# Patient Record
Sex: FEMALE | Race: WHITE
[De-identification: names, ages, dates, MRNs, and addresses within clinical notes are randomized per-mention and may not be internally consistent; named-entity substitution may affect disease eponyms.]

---

## 2017-03-21 ENCOUNTER — HOSPITAL ENCOUNTER (EMERGENCY)
Dept: HOSPITAL 63 - ER | Age: 67
Discharge: HOME | End: 2017-03-21
Payer: MEDICARE

## 2017-03-21 VITALS — BODY MASS INDEX: 22.15 KG/M2 | WEIGHT: 125 LBS | HEIGHT: 63 IN

## 2017-03-21 VITALS — DIASTOLIC BLOOD PRESSURE: 76 MMHG | SYSTOLIC BLOOD PRESSURE: 135 MMHG

## 2017-03-21 DIAGNOSIS — E78.00: ICD-10-CM

## 2017-03-21 DIAGNOSIS — R07.9: Primary | ICD-10-CM

## 2017-03-21 DIAGNOSIS — F41.9: ICD-10-CM

## 2017-03-21 DIAGNOSIS — Z86.73: ICD-10-CM

## 2017-03-21 DIAGNOSIS — I10: ICD-10-CM

## 2017-03-21 LAB
ALBUMIN SERPL-MCNC: 3.7 G/DL (ref 3.4–5)
ALBUMIN/GLOB SERPL: 1.2 {RATIO} (ref 1–1.7)
ALP SERPL-CCNC: 92 U/L (ref 46–116)
ALT SERPL-CCNC: 29 U/L (ref 14–59)
ANION GAP SERPL CALC-SCNC: 9 MMOL/L (ref 6–14)
AST SERPL-CCNC: 24 U/L (ref 15–37)
BASOPHILS # BLD AUTO: 0 X10^3/UL (ref 0–0.2)
BASOPHILS NFR BLD: 0 % (ref 0–3)
BILIRUB SERPL-MCNC: 0.9 MG/DL (ref 0.2–1)
BUN ISTAT: 15 MG/DL (ref 8–26)
BUN/CREAT SERPL: 15 (ref 6–20)
CA-I SERPL ISE-MCNC: 16 MG/DL (ref 7–20)
CALCIUM SERPL-MCNC: 8.9 MG/DL (ref 8.5–10.1)
CHLORIDE SERPL-SCNC: 107 MMOL/L (ref 98–107)
CO2 SERPL-SCNC: 28 MMOL/L (ref 21–32)
CREAT SERPL-MCNC: 1.1 MG/DL (ref 0.6–1)
EOSINOPHIL NFR BLD: 0.2 X10^3/UL (ref 0–0.7)
EOSINOPHIL NFR BLD: 3 % (ref 0–3)
ERYTHROCYTE [DISTWIDTH] IN BLOOD BY AUTOMATED COUNT: 12.8 % (ref 11.5–14.5)
GFR SERPLBLD BASED ON 1.73 SQ M-ARVRAT: 49.4 ML/MIN
GLOBULIN SER-MCNC: 3.1 G/DL (ref 2.2–3.8)
GLUCOSE BLD-MCNC: 134 MG/DL (ref 60–99)
GLUCOSE SERPL-MCNC: 138 MG/DL (ref 70–99)
HCT VFR BLD AUTO: 37 %
HCT VFR BLD CALC: 38.5 % (ref 36–47)
HEMOGLOBIN ISTAT: 12.6 GM/DL
HGB BLD-MCNC: 13 G/DL (ref 12–15.5)
LYMPHOCYTES # BLD: 1.8 X10^3/UL (ref 1–4.8)
LYMPHOCYTES NFR BLD AUTO: 28 % (ref 24–48)
MCH RBC QN AUTO: 32 PG (ref 25–35)
MCHC RBC AUTO-ENTMCNC: 34 G/DL (ref 31–37)
MCV RBC AUTO: 94 FL (ref 79–100)
MONO #: 0.4 X10^3/UL (ref 0–1.1)
MONOCYTES NFR BLD: 6 % (ref 0–9)
NEUT #: 4.1 X10^3UL (ref 1.8–7.7)
NEUTROPHILS NFR BLD AUTO: 63 % (ref 31–73)
PLATELET # BLD AUTO: 188 X10^3/UL (ref 140–400)
POTASSIUM BLD-SCNC: 3.5 MMOL/L (ref 3.5–5)
POTASSIUM SERPL-SCNC: 3.8 MMOL/L (ref 3.5–5.1)
PROT SERPL-MCNC: 6.8 G/DL (ref 6.4–8.2)
RBC # BLD AUTO: 4.1 X10^6/UL (ref 3.5–5.4)
SODIUM SERPL-SCNC: 141 MMOL/L (ref 135–145)
SODIUM SERPL-SCNC: 144 MMOL/L (ref 136–145)
WBC # BLD AUTO: 6.4 X10^3/UL (ref 4–11)

## 2017-03-21 PROCEDURE — 80047 BASIC METABLC PNL IONIZED CA: CPT

## 2017-03-21 PROCEDURE — 84484 ASSAY OF TROPONIN QUANT: CPT

## 2017-03-21 PROCEDURE — 80053 COMPREHEN METABOLIC PANEL: CPT

## 2017-03-21 PROCEDURE — 93005 ELECTROCARDIOGRAM TRACING: CPT

## 2017-03-21 PROCEDURE — 71020: CPT

## 2017-03-21 PROCEDURE — 36415 COLL VENOUS BLD VENIPUNCTURE: CPT

## 2017-03-21 PROCEDURE — 85027 COMPLETE CBC AUTOMATED: CPT

## 2017-03-21 NOTE — EKG
[facility] [address], [place] [address]

Test Date:    2017               Test Time:    17:50:28

Pat Name:     LANA KATHLEEN           Department:   

Patient ID:   [id number]           Room:          

Gender:       F                        Technician:   RODRIGO

:          1950               Requested By: MATI OSMAN

Order Number: [id number]            Reading MD:     

                                 Measurements

Intervals                              Axis          

Rate:         72                       P:            40

SD:           146                      QRS:          -39

QRSD:         84                       T:            44

QT:           404                                    

QTc:          444                                    

                           Interpretive Statements

SINUS RHYTHM

ABNORMAL LEFT AXIS DEVIATION

R-S TRANSITION ZONE IN V LEADS DISPLACED TO THE LEFT

LEFT ANTERIOR FASCICULAR BLOCK

ABNORMAL ECG

RI6.01          Unconfirmed report

No previous ECG available for comparison

## 2017-03-21 NOTE — ED.ADGEN
Past History


Past Medical History:  Anxiety, High Cholesterol, Hypertension, TIA


Past Surgical History:  Appendectomy, Hysterectomy


Alcohol Use:  None


Drug Use:  None





Adult General


HPI


HPI





Patient is a 66-year-old female presents complaining of 4-5 day history of 

intermittent palpitations. She denies any associated diaphoresis, dyspnea, 

nausea, vomiting. She states that it is most pronounced at night which she 

relates out of bed and begins to think about all the increased stress and 

events in her life. She does have a history of anxiety. She was seen by her 

counselor yesterday. She did take an Ativan shortly prior to arrival today 

reports that is made an improvement in her symptoms. She denies any trisha pain 

but reports a "fluttering" on both sides of her chest.





Review of Systems


Review of Systems





Constitutional: Denies fever or chills []


Eyes: Denies change in visual acuity, redness, or eye pain []


HENT: Denies nasal congestion or sore throat []


Respiratory: Denies cough or shortness of breath []


Cardiovascular: No additional information not addressed in HPI []


GI: Denies abdominal pain, nausea, vomiting, bloody stools or diarrhea []


: Denies dysuria or hematuria []


Musculoskeletal: Denies back pain or joint pain []


Integument: Denies rash or skin lesions []


Neurologic: Denies headache, focal weakness or sensory changes []


Endocrine: Denies polyuria or polydipsia []





Allergies


Allergies





 Allergies








Coded Allergies Type Severity Reaction Last Updated Verified


 


  No Known Drug Allergies    3/21/17 No











Physical Exam


Physical Exam





Constitutional: Well developed, well nourished, no acute distress, non-toxic 

appearance. []


HENT: Normocephalic, atraumatic, bilateral external ears normal, oropharynx 

moist, no oral exudates, nose normal. []


Eyes: PERRLA, EOMI, conjunctiva normal, no discharge. [] 


Neck: Normal range of motion, no tenderness, supple, no stridor. [] 


Cardiovascular:Heart rate regular rhythm, no murmur []


Lungs & Thorax:  Bilateral breath sounds clear to auscultation []


Abdomen: Bowel sounds normal, soft, no tenderness, no masses, no pulsatile 

masses. [] 


Skin: Warm, dry, no erythema, no rash. [] 


Back: No tenderness, no CVA tenderness. [] 


Extremities: No tenderness, no cyanosis, no clubbing, ROM intact, no edema. [] 


Neurologic: Alert and oriented X 3, normal motor function, normal sensory 

function, no focal deficits noted. []


Psychologic: Affect normal, judgement normal, mood normal. []





Current Patient Data


Vital Signs





 Vital Signs








  Date Time  Temp Pulse Resp B/P Pulse Ox O2 Delivery O2 Flow Rate FiO2


 


3/21/17 17:25 98.2 78 18  98 Room Air  








Lab Results





 Laboratory Tests








Test


  3/21/17


18:10 3/21/17


18:14 3/21/17


18:30


 


White Blood Count


  6.4x10^3/uL


(4.0-11.0) 


  


 


 


Red Blood Count


  4.10x10^6/uL


(3.50-5.40) 


  


 


 


Hemoglobin


  13.0g/dL


(12.0-15.5) 


  


 


 


Hematocrit


  38.5%


(36.0-47.0) 


  


 


 


Mean Corpuscular Volume 94fL ()    


 


Mean Corpuscular Hemoglobin 32pg (25-35)    


 


Mean Corpuscular Hemoglobin


Concent 34g/dL (31-37)


  


  


 


 


Red Cell Distribution Width


  12.8%


(11.5-14.5) 


  


 


 


Platelet Count


  188x10^3/uL


(140-400) 


  


 


 


Neutrophils (%) (Auto) 63% (31-73)    


 


Lymphocytes (%) (Auto) 28% (24-48)    


 


Monocytes (%) (Auto) 6% (0-9)    


 


Eosinophils (%) (Auto) 3% (0-3)    


 


Basophils (%) (Auto) 0% (0-3)    


 


Neutrophils # (Auto)


  4.1x10^3uL


(1.8-7.7) 


  


 


 


Lymphocytes # (Auto)


  1.8x10^3/uL


(1.0-4.8) 


  


 


 


Monocytes # (Auto)


  0.4x10^3/uL


(0.0-1.1) 


  


 


 


Eosinophils # (Auto)


  0.2x10^3/uL


(0.0-0.7) 


  


 


 


Basophils # (Auto)


  0.0x10^3/uL


(0.0-0.2) 


  


 


 


Sodium Level


  144mmol/L


(136-145) 


  


 


 


Potassium Level


  3.8mmol/L


(3.5-5.1) 


  


 


 


Chloride Level


  107mmol/L


() 


  


 


 


Carbon Dioxide Level


  28mmol/L


(21-32) 


  


 


 


Anion Gap


  9 (6-14)  


  


  18mmol/L


(6-14)  H


 


Blood Urea Nitrogen


  16mg/dL (7-20)


  


  


 


 


Creatinine


  1.1mg/dL


(0.6-1.0)  H 


  


 


 


Estimated GFR


(Cockcroft-Gault) 49.4  


  


  


 


 


BUN/Creatinine Ratio 15 (6-20)    


 


Glucose Level


  138mg/dL


(70-99)  H 


  134mg/dL


(60-99)  H


 


Calcium Level


  8.9mg/dL


(8.5-10.1) 


  


 


 


Total Bilirubin


  0.9mg/dL


(0.2-1.0) 


  


 


 


Aspartate Amino Transferase


(AST) 24U/L (15-37)  


  


  


 


 


Alanine Aminotransferase (ALT) 29U/L (14-59)    


 


Alkaline Phosphatase


  92U/L ()


  


  


 


 


Troponin I Quantitative


  < 0.017ng/mL


(0-0.055) 


  


 


 


Total Protein


  6.8g/dL


(6.4-8.2) 


  


 


 


Albumin


  3.7g/dL


(3.4-5.0) 


  


 


 


Albumin/Globulin Ratio 1.2 (1.0-1.7)    


 


POC Troponin I


  


  0.00ng/ml


(<0.08) 


 


 


POC Hemoglobin   12.6gm/dL  


 


POC Hematocrit   37%  


 


POC Sodium


  


  


  141mmol/L


(135-145)


 


POC Potassium


  


  


  3.5mmol/L


(3.5-5.0)


 


POC Chloride


  


  


  103mmol/L


()


 


POC Total CO2


  


  


  25mmol/L


(23-32)


 


POC Blood Urea Nitrogen


  


  


  15mg/dL (8-26)


 


 


POC Creatinine


  


  


  0.9mg/dL


(0.5-1.4)


 


POC Ionized Calcium (Juliet)


  


  


  1.13mmol/L


(1.13-1.32)











EKG


EKG


EKG interpreted by me, normal sinus rhythm, 70 bpm, no ST segment elevation, 

left axis deviation. []





Radiology/Procedures


Radiology/Procedures


Chest x-ray interpreted by me, no acute cardiopulmonary process. []





Course & Med Decision Making


Course & Med Decision Making


Pertinent Labs and Imaging studies reviewed. (See chart for details)


Given the patient's reassuring constellation of symptoms and her history of 

anxiety combined with her reassuring workup today do believe the patient is 

appropriate for discharge. She will follow with her primary care physician in 

the next 2-3 days and return emergency department sooner she develops any new 

or worsening symptoms.


[]





Final Impression


Final Impression


Chest pain 


Anxiety[]


Problems:  





Dragon Disclaimer


Dragon Disclaimer


This electronic medical record was generated, in whole or in part, using a 

voice recognition dictation system.








MATI OSMAN MD Mar 21, 2017 18:29

## 2017-03-22 NOTE — RAD
Chest, 2 views, 3/21/2017:



History: Chest pain with palpitations



The heart size and pulmonary vascularity are normal. No pulmonary infiltrates

are seen. There is no evidence of pleural fluid.



IMPRESSION: No acute cardiopulmonary abnormality is detected.

## 2022-05-09 ENCOUNTER — HOSPITAL ENCOUNTER (EMERGENCY)
Dept: HOSPITAL 63 - ER | Age: 72
Discharge: HOME | End: 2022-05-09
Payer: MEDICARE

## 2022-05-09 VITALS — BODY MASS INDEX: 25.94 KG/M2 | HEIGHT: 63 IN | WEIGHT: 146.39 LBS

## 2022-05-09 VITALS — SYSTOLIC BLOOD PRESSURE: 156 MMHG | DIASTOLIC BLOOD PRESSURE: 81 MMHG

## 2022-05-09 DIAGNOSIS — I10: ICD-10-CM

## 2022-05-09 DIAGNOSIS — Z13.89: Primary | ICD-10-CM

## 2022-05-09 DIAGNOSIS — E78.00: ICD-10-CM

## 2022-05-09 DIAGNOSIS — F41.9: ICD-10-CM

## 2022-05-09 DIAGNOSIS — F03.90: ICD-10-CM

## 2022-05-09 DIAGNOSIS — Z20.822: ICD-10-CM

## 2022-05-09 DIAGNOSIS — Z86.73: ICD-10-CM

## 2022-05-09 LAB
ACETAMIN: < 2 MCG/ML (ref 10–30)
ALBUMIN SERPL-MCNC: 3.7 G/DL (ref 3.4–5)
ALBUMIN/GLOB SERPL: 1 {RATIO} (ref 1–1.7)
ALP SERPL-CCNC: 101 U/L (ref 46–116)
ALT SERPL-CCNC: 22 U/L (ref 14–59)
ANION GAP SERPL CALC-SCNC: 12 MMOL/L (ref 6–14)
AST SERPL-CCNC: 17 U/L (ref 15–37)
BASOPHILS # BLD AUTO: 0.1 X10^3/UL (ref 0–0.2)
BASOPHILS NFR BLD: 1 % (ref 0–3)
BILIRUB SERPL-MCNC: 0.6 MG/DL (ref 0.2–1)
BUN/CREAT SERPL: 22 (ref 6–20)
CA-I SERPL ISE-MCNC: 20 MG/DL (ref 7–20)
CALCIUM SERPL-MCNC: 9.3 MG/DL (ref 8.5–10.1)
CHLORIDE SERPL-SCNC: 104 MMOL/L (ref 98–107)
CO2 SERPL-SCNC: 23 MMOL/L (ref 21–32)
CREAT SERPL-MCNC: 0.9 MG/DL (ref 0.6–1)
EOSINOPHIL NFR BLD: 0 X10^3/UL (ref 0–0.7)
EOSINOPHIL NFR BLD: 1 % (ref 0–3)
ERYTHROCYTE [DISTWIDTH] IN BLOOD BY AUTOMATED COUNT: 13.8 % (ref 11.5–14.5)
ETHANOL SERPL-MCNC: < 10 MG/DL (ref 0–10)
GFR SERPLBLD BASED ON 1.73 SQ M-ARVRAT: 61.4 ML/MIN
GLOBULIN SER-MCNC: 3.8 G/DL (ref 2.2–3.8)
GLUCOSE SERPL-MCNC: 91 MG/DL (ref 70–99)
HCT VFR BLD CALC: 41.4 % (ref 36–47)
HGB BLD-MCNC: 13.8 G/DL (ref 12–15.5)
INFLUENZA A PATIENT: NEGATIVE
INFLUENZA B PATIENT: NEGATIVE
LYMPHOCYTES # BLD: 1.3 X10^3/UL (ref 1–4.8)
LYMPHOCYTES NFR BLD AUTO: 15 % (ref 24–48)
MAGNESIUM SERPL-MCNC: 2.3 MG/DL (ref 1.8–2.4)
MCH RBC QN AUTO: 32 PG (ref 25–35)
MCHC RBC AUTO-ENTMCNC: 33 G/DL (ref 31–37)
MCV RBC AUTO: 96 FL (ref 79–100)
MONO #: 0.5 X10^3/UL (ref 0–1.1)
MONOCYTES NFR BLD: 5 % (ref 0–9)
NEUT #: 6.9 X10^3UL (ref 1.8–7.7)
NEUTROPHILS NFR BLD AUTO: 79 % (ref 31–73)
PHOSPHATE SERPL-MCNC: 3.1 MG/DL (ref 2.6–4.7)
PLATELET # BLD AUTO: 244 X10^3/UL (ref 140–400)
POTASSIUM SERPL-SCNC: 4.1 MMOL/L (ref 3.5–5.1)
PROT SERPL-MCNC: 7.5 G/DL (ref 6.4–8.2)
RBC # BLD AUTO: 4.3 X10^6/UL (ref 3.5–5.4)
SALIC: 1.2 MG/DL (ref 2.8–20)
SODIUM SERPL-SCNC: 139 MMOL/L (ref 136–145)
WBC # BLD AUTO: 8.7 X10^3/UL (ref 4–11)

## 2022-05-09 PROCEDURE — 85025 COMPLETE CBC W/AUTO DIFF WBC: CPT

## 2022-05-09 PROCEDURE — U0003 INFECTIOUS AGENT DETECTION BY NUCLEIC ACID (DNA OR RNA); SEVERE ACUTE RESPIRATORY SYNDROME CORONAVIRUS 2 (SARS-COV-2) (CORONAVIRUS DISEASE [COVID-19]), AMPLIFIED PROBE TECHNIQUE, MAKING USE OF HIGH THROUGHPUT TECHNOLOGIES AS DESCRIBED BY CMS-2020-01-R: HCPCS

## 2022-05-09 PROCEDURE — 99284 EMERGENCY DEPT VISIT MOD MDM: CPT

## 2022-05-09 PROCEDURE — 87428 SARSCOV & INF VIR A&B AG IA: CPT

## 2022-05-09 PROCEDURE — 84443 ASSAY THYROID STIM HORMONE: CPT

## 2022-05-09 PROCEDURE — 84100 ASSAY OF PHOSPHORUS: CPT

## 2022-05-09 PROCEDURE — 80329 ANALGESICS NON-OPIOID 1 OR 2: CPT

## 2022-05-09 PROCEDURE — 83735 ASSAY OF MAGNESIUM: CPT

## 2022-05-09 PROCEDURE — 83880 ASSAY OF NATRIURETIC PEPTIDE: CPT

## 2022-05-09 PROCEDURE — 80053 COMPREHEN METABOLIC PANEL: CPT

## 2022-05-09 PROCEDURE — 70450 CT HEAD/BRAIN W/O DYE: CPT

## 2022-05-09 PROCEDURE — 36415 COLL VENOUS BLD VENIPUNCTURE: CPT

## 2022-05-09 PROCEDURE — G0480 DRUG TEST DEF 1-7 CLASSES: HCPCS

## 2022-05-09 PROCEDURE — C9803 HOPD COVID-19 SPEC COLLECT: HCPCS

## 2022-05-09 PROCEDURE — 84484 ASSAY OF TROPONIN QUANT: CPT

## 2022-05-09 PROCEDURE — 93005 ELECTROCARDIOGRAM TRACING: CPT

## 2022-05-09 NOTE — RAD
CT scan of the head without contrast 5/9/2022



Clinical History: Altered mental status.



Technique: Unenhanced, contiguous, 5 mm axial sections were obtained through the head.



One or more of the following individualized dose reduction techniques were utilized for this study:



1. Automated exposure control.

2. Adjustment of the mA and/or kV according to patient size.

3. Use of iterative reconstruction technique.





Findings: Comparison study is dated 12/8/2016.



There is generalized parenchymal atrophy. Areas of decreased attenuation are seen within the perivent
ricular and subcortical white matter of both cerebral hemispheres consistent with areas of small vess
el ischemic disease. No acute parenchymal abnormality is seen. No extra-axial fluid collection is not
ed. No skull fracture is seen.



Impression:  No acute intracranial abnormality is seen.



Electronically signed by: Melvin Long MD (5/9/2022 12:32 PM) [id number]
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
## 2022-05-09 NOTE — EKG
[facility] [address], [place] [address]

Test Date:    2022               Test Time:    13:06:48

Pat Name:     LANA KATHLEEN           Department:   

Patient ID:   [id number]           Room:          

Gender:       F                        Technician:   SHAHAB

:          1950               Requested By: ANA PEDROZA

Order Number: [id number]            Reading MD:   Serafin Vines

                                 Measurements

Intervals                              Axis          

Rate:         62                       P:            -26

IA:           126                      QRS:          -26

QRSD:         76                       T:            47

QT:           442                                    

QTc:          451                                    

                           Interpretive Statements

SINUS RHYTHM

LEFTWARD AXIS

Electronically Signed On 2022 17:16:39 CDT by Serafin Vines